# Patient Record
Sex: MALE | Race: BLACK OR AFRICAN AMERICAN | NOT HISPANIC OR LATINO | URBAN - METROPOLITAN AREA
[De-identification: names, ages, dates, MRNs, and addresses within clinical notes are randomized per-mention and may not be internally consistent; named-entity substitution may affect disease eponyms.]

---

## 2020-01-21 ENCOUNTER — EMERGENCY (EMERGENCY)
Facility: HOSPITAL | Age: 55
LOS: 1 days | Discharge: HOME | End: 2020-01-21
Attending: EMERGENCY MEDICINE | Admitting: EMERGENCY MEDICINE
Payer: SELF-PAY

## 2020-01-21 VITALS
WEIGHT: 179.9 LBS | SYSTOLIC BLOOD PRESSURE: 180 MMHG | TEMPERATURE: 98 F | DIASTOLIC BLOOD PRESSURE: 115 MMHG | OXYGEN SATURATION: 97 % | HEART RATE: 91 BPM | HEIGHT: 66 IN | RESPIRATION RATE: 18 BRPM

## 2020-01-21 VITALS
DIASTOLIC BLOOD PRESSURE: 106 MMHG | OXYGEN SATURATION: 98 % | HEART RATE: 82 BPM | SYSTOLIC BLOOD PRESSURE: 156 MMHG | RESPIRATION RATE: 18 BRPM

## 2020-01-21 LAB
APPEARANCE UR: CLEAR — SIGNIFICANT CHANGE UP
BILIRUB UR-MCNC: ABNORMAL
COLOR SPEC: YELLOW — SIGNIFICANT CHANGE UP
DIFF PNL FLD: ABNORMAL
GLUCOSE UR QL: NEGATIVE — SIGNIFICANT CHANGE UP
KETONES UR-MCNC: NEGATIVE — SIGNIFICANT CHANGE UP
LEUKOCYTE ESTERASE UR-ACNC: ABNORMAL
NITRITE UR-MCNC: NEGATIVE — SIGNIFICANT CHANGE UP
PH UR: 5.5 — SIGNIFICANT CHANGE UP (ref 5–8)
PROT UR-MCNC: >=300 MG/DL
SP GR SPEC: >=1.03 — SIGNIFICANT CHANGE UP (ref 1–1.03)
UROBILINOGEN FLD QL: 0.2 E.U./DL — SIGNIFICANT CHANGE UP

## 2020-01-21 PROCEDURE — 76870 US EXAM SCROTUM: CPT | Mod: 26

## 2020-01-21 PROCEDURE — 87591 N.GONORRHOEAE DNA AMP PROB: CPT

## 2020-01-21 PROCEDURE — 93975 VASCULAR STUDY: CPT

## 2020-01-21 PROCEDURE — 87491 CHLMYD TRACH DNA AMP PROBE: CPT

## 2020-01-21 PROCEDURE — 99284 EMERGENCY DEPT VISIT MOD MDM: CPT | Mod: 25

## 2020-01-21 PROCEDURE — 87186 SC STD MICRODIL/AGAR DIL: CPT

## 2020-01-21 PROCEDURE — 87086 URINE CULTURE/COLONY COUNT: CPT

## 2020-01-21 PROCEDURE — 76870 US EXAM SCROTUM: CPT

## 2020-01-21 PROCEDURE — 93975 VASCULAR STUDY: CPT | Mod: 26

## 2020-01-21 PROCEDURE — 81001 URINALYSIS AUTO W/SCOPE: CPT

## 2020-01-21 PROCEDURE — 99284 EMERGENCY DEPT VISIT MOD MDM: CPT

## 2020-01-21 RX ORDER — AZTREONAM 2 G
160 VIAL (EA) INJECTION
Qty: 4480 | Refills: 0
Start: 2020-01-21 | End: 2020-02-03

## 2020-01-21 RX ADMIN — Medication 1 TABLET(S): at 15:50

## 2020-01-21 NOTE — ED PROVIDER NOTE - PATIENT PORTAL LINK FT
You can access the FollowMyHealth Patient Portal offered by [facility] by registering at the following website: [email or web address]. By joining [employer]’s FollowMyHealth portal, you will also be able to view your health information using other applications (apps) compatible with our system.

## 2020-01-21 NOTE — ED ADULT TRIAGE NOTE - CHIEF COMPLAINT QUOTE
"I have pain when I urinate and it hurts "down there" patient states that he noticed blood one time yesterday in his urine. denies fever, chills

## 2020-01-21 NOTE — ED PROVIDER NOTE - CARE PROVIDER_API CALL
Aydee Santos)  Urology  [address] [place] [address]  Phone: [phone]  Fax: [phone]  Follow Up Time:

## 2020-01-21 NOTE — ED PROVIDER NOTE - CARE PROVIDERS DIRECT ADDRESSES
[email or web address]

## 2020-01-21 NOTE — ED PROVIDER NOTE - ATTENDING CONTRIBUTION TO CARE
3 days frequency, dysuria, no fevers, no vomiting, testicular pain, not sexually active, tender to perineaum and L testicle, no epidydimal tenderness, no testicular swelling , nl lie, 3 days frequency, dysuria, no fevers, no vomiting, testicular pain, not sexually active, tender to perineaum and L testicle and , no epidydimal tenderness, no testicular swelling as per PA, ,suspect likely prostatitis,

## 2020-01-21 NOTE — ED PROVIDER NOTE - NSFOLLOWUPCLINICS_GEN_ALL_ED_FT
[facility] [address] [place] [address]  Family Medicine  [address] [place] [address]  Phone: [phone]  Fax: [phone]    [facility]  Urology  [address] [place] [address]  Phone: [phone]  Fax:   Follow Up Time:

## 2020-01-21 NOTE — ED PROVIDER NOTE - CLINICAL SUMMARY MEDICAL DECISION MAKING FREE TEXT BOX
Patient with perineal pain and urgency. + UA and neg scrotal sonogram. Possible prostate infection will tx empirically. Genital culture pending. Follow up with urology.

## 2020-01-21 NOTE — ED PROVIDER NOTE - OBJECTIVE STATEMENT
53 y/o M with PMHx of HTN, visiting from [facility] presenting to the ED for dysuria and frequency with left groin/LLQ pain x 2 days as well as lower back pain x 2 weeks. Pt has been waking up intermittently in the middle of the night to urinate, describing his urination as "trickling". Denies any recent injuries, falls, heavy lifting, penile discharge, diarrhea or hx of kidney stones. 55 y/o M with PMHx of HTN, visiting from [facility] presenting to the ED for dysuria and frequency with  perineal pain x 2days as well as lower back pain x 2 weeks. Pt has been waking up intermittently in the middle of the night to urinate, describing his urination as "trickling". Denies fever, any recent injuries, falls, heavy lifting, penile discharge, diarrhea or hx of kidney stones. No dysuria or hematuria. Last sexual encounter was over a year ago.

## 2020-01-21 NOTE — ED PROVIDER NOTE - NSFOLLOWUPINSTRUCTIONS_ED_ALL_ED_FT
EnglishSpanish    Prostatitis     Prostatitis is swelling of the prostate gland. The prostate helps to make semen. It is below a man's bladder, in front of the rectum. There are different types of prostatitis.  Follow these instructions at home:     Take over-the-counter and prescription medicines only as told by your doctor.If you were prescribed an antibiotic medicine, take it as told by your doctor. Do not stop taking the antibiotic even if you start to feel better.If your doctor prescribed exercises, do them as directed.Take sitz baths as told by your doctor. To take a sitz bath, sit in warm water that is deep enough to cover your hips and butt.Keep all follow-up visits as told by your doctor. This is important.Contact a doctor if:  Your symptoms get worse.You have a fever.Get help right away if:  You have chills.You feel sick to your stomach (nauseous).You throw up (vomit).You feel light-headed.You feel like you might pass out (faint).You cannot pee (urinate).You have blood or clumps of blood (blood clots) in your pee (urine).This information is not intended to replace advice given to you by your health care provider. Make sure you discuss any questions you have with your health care provider.    Document Released: 06/18/2013 Document Revised: 09/07/2017 Document Reviewed: 09/07/2017  [employer] Patient Education © 2019 [employer]

## 2020-01-21 NOTE — ED PROVIDER NOTE - GASTROINTESTINAL, MLM
Abdomen soft, non-tender, no guarding. No CVAT. Tenderness near perineum area. No swelling to testicles. No changes in color or temperature. No epididymal tenderness.

## 2020-01-22 LAB
C TRACH RRNA SPEC QL NAA+PROBE: SIGNIFICANT CHANGE UP
N GONORRHOEA RRNA SPEC QL NAA+PROBE: SIGNIFICANT CHANGE UP
SPECIMEN SOURCE: SIGNIFICANT CHANGE UP

## 2020-01-23 LAB
-  AMIKACIN: SIGNIFICANT CHANGE UP
-  AMPICILLIN/SULBACTAM: SIGNIFICANT CHANGE UP
-  AMPICILLIN: SIGNIFICANT CHANGE UP
-  CEFAZOLIN: SIGNIFICANT CHANGE UP
-  CEFTRIAXONE: SIGNIFICANT CHANGE UP
-  CIPROFLOXACIN: SIGNIFICANT CHANGE UP
-  GENTAMICIN: SIGNIFICANT CHANGE UP
-  MEROPENEM: SIGNIFICANT CHANGE UP
-  NITROFURANTOIN: SIGNIFICANT CHANGE UP
-  PIPERACILLIN/TAZOBACTAM: SIGNIFICANT CHANGE UP
-  TOBRAMYCIN: SIGNIFICANT CHANGE UP
-  TRIMETHOPRIM/SULFAMETHOXAZOLE: SIGNIFICANT CHANGE UP
CULTURE RESULTS: SIGNIFICANT CHANGE UP
METHOD TYPE: SIGNIFICANT CHANGE UP
ORGANISM # SPEC MICROSCOPIC CNT: SIGNIFICANT CHANGE UP
ORGANISM # SPEC MICROSCOPIC CNT: SIGNIFICANT CHANGE UP
SPECIMEN SOURCE: SIGNIFICANT CHANGE UP

## 2020-01-23 NOTE — ED POST DISCHARGE NOTE - ADDITIONAL DOCUMENTATION
Pt w 10,000 ecoli esbl.  Cx not c/w sig infection given low colony ct.  Pt dc'd w bactrim.  U cx shows + susceptibility to bactrim.  Will let pt cont on current abx given dx of prostatitis.

## 2020-01-25 NOTE — ED ADULT NURSE NOTE - PAIN RATING/NUMBER SCALE (0-10): ACTIVITY
6
You can access the FollowMyHealth Patient Portal offered by [facility] by registering at the following website: [email or web address]. By joining [employer]’s FollowMyHealth portal, you will also be able to view your health information using other applications (apps) compatible with our system.

## 2020-01-27 PROBLEM — Z00.00 ENCOUNTER FOR PREVENTIVE HEALTH EXAMINATION: Status: ACTIVE | Noted: 2020-01-27

## 2020-01-31 DIAGNOSIS — R35.0 FREQUENCY OF MICTURITION: ICD-10-CM

## 2020-01-31 DIAGNOSIS — N41.9 INFLAMMATORY DISEASE OF PROSTATE, UNSPECIFIED: ICD-10-CM

## 2025-08-05 NOTE — ED ADULT NURSE NOTE - ALCOHOL PRE SCREEN (AUDIT - C)
Stable based on symptoms and exam.  Continue established treatment plan and follow-up at least yearly.    Statement Selected